# Patient Record
Sex: FEMALE | Race: ASIAN | NOT HISPANIC OR LATINO | URBAN - METROPOLITAN AREA
[De-identification: names, ages, dates, MRNs, and addresses within clinical notes are randomized per-mention and may not be internally consistent; named-entity substitution may affect disease eponyms.]

---

## 2023-04-11 ENCOUNTER — INPATIENT (INPATIENT)
Facility: HOSPITAL | Age: 33
LOS: 2 days | Discharge: HOME | DRG: 385 | End: 2023-04-14
Attending: PLASTIC SURGERY | Admitting: PLASTIC SURGERY
Payer: MEDICAID

## 2023-04-11 VITALS
RESPIRATION RATE: 18 BRPM | HEART RATE: 96 BPM | DIASTOLIC BLOOD PRESSURE: 61 MMHG | TEMPERATURE: 100 F | OXYGEN SATURATION: 95 % | SYSTOLIC BLOOD PRESSURE: 107 MMHG

## 2023-04-11 DIAGNOSIS — T50.905A ADVERSE EFFECT OF UNSPECIFIED DRUGS, MEDICAMENTS AND BIOLOGICAL SUBSTANCES, INITIAL ENCOUNTER: ICD-10-CM

## 2023-04-11 LAB
ALBUMIN SERPL ELPH-MCNC: 3.5 G/DL — SIGNIFICANT CHANGE UP (ref 3.5–5.2)
ALP SERPL-CCNC: 282 U/L — HIGH (ref 30–115)
ALT FLD-CCNC: 169 U/L — HIGH (ref 0–41)
ANION GAP SERPL CALC-SCNC: 11 MMOL/L — SIGNIFICANT CHANGE UP (ref 7–14)
APAP SERPL-MCNC: <5 UG/ML — LOW (ref 10–30)
APTT BLD: 36.3 SEC — SIGNIFICANT CHANGE UP (ref 27–39.2)
AST SERPL-CCNC: 164 U/L — HIGH (ref 0–41)
BASE EXCESS BLDV CALC-SCNC: -2.3 MMOL/L — LOW (ref -2–3)
BASOPHILS # BLD AUTO: 0 K/UL — SIGNIFICANT CHANGE UP (ref 0–0.2)
BASOPHILS NFR BLD AUTO: 0 % — SIGNIFICANT CHANGE UP (ref 0–1)
BILIRUB DIRECT SERPL-MCNC: 0.2 MG/DL — SIGNIFICANT CHANGE UP (ref 0–0.3)
BILIRUB INDIRECT FLD-MCNC: 0.2 MG/DL — SIGNIFICANT CHANGE UP (ref 0.2–1.2)
BILIRUB SERPL-MCNC: 0.4 MG/DL — SIGNIFICANT CHANGE UP (ref 0.2–1.2)
BLD GP AB SCN SERPL QL: SIGNIFICANT CHANGE UP
BUN SERPL-MCNC: 6 MG/DL — LOW (ref 10–20)
CA-I SERPL-SCNC: 1.03 MMOL/L — LOW (ref 1.15–1.33)
CALCIUM SERPL-MCNC: 7.7 MG/DL — LOW (ref 8.4–10.5)
CHLORIDE SERPL-SCNC: 100 MMOL/L — SIGNIFICANT CHANGE UP (ref 98–110)
CO2 SERPL-SCNC: 19 MMOL/L — SIGNIFICANT CHANGE UP (ref 17–32)
CREAT SERPL-MCNC: 0.7 MG/DL — SIGNIFICANT CHANGE UP (ref 0.7–1.5)
EGFR: 119 ML/MIN/1.73M2 — SIGNIFICANT CHANGE UP
EOSINOPHIL # BLD AUTO: 0.17 K/UL — SIGNIFICANT CHANGE UP (ref 0–0.7)
EOSINOPHIL NFR BLD AUTO: 1.7 % — SIGNIFICANT CHANGE UP (ref 0–8)
FLUAV AG NPH QL: SIGNIFICANT CHANGE UP
FLUBV AG NPH QL: SIGNIFICANT CHANGE UP
GAS PNL BLDV: 130 MMOL/L — LOW (ref 136–145)
GAS PNL BLDV: SIGNIFICANT CHANGE UP
GAS PNL BLDV: SIGNIFICANT CHANGE UP
GIANT PLATELETS BLD QL SMEAR: PRESENT — SIGNIFICANT CHANGE UP
GLUCOSE SERPL-MCNC: 161 MG/DL — HIGH (ref 70–99)
HCG SERPL QL: NEGATIVE — SIGNIFICANT CHANGE UP
HCO3 BLDV-SCNC: 21 MMOL/L — LOW (ref 22–29)
HCT VFR BLD CALC: 32.3 % — LOW (ref 37–47)
HCT VFR BLDA CALC: 34 % — LOW (ref 39–51)
HGB BLD CALC-MCNC: 11.3 G/DL — LOW (ref 12.6–17.4)
HGB BLD-MCNC: 11.2 G/DL — LOW (ref 12–16)
HIV 1 & 2 AB SERPL IA.RAPID: SIGNIFICANT CHANGE UP
INR BLD: 1.07 RATIO — SIGNIFICANT CHANGE UP (ref 0.65–1.3)
LACTATE BLDV-MCNC: 1.2 MMOL/L — SIGNIFICANT CHANGE UP (ref 0.5–2)
LACTATE SERPL-SCNC: 1.4 MMOL/L — SIGNIFICANT CHANGE UP (ref 0.7–2)
LIDOCAIN IGE QN: 49 U/L — SIGNIFICANT CHANGE UP (ref 7–60)
LYMPHOCYTES # BLD AUTO: 1.97 K/UL — SIGNIFICANT CHANGE UP (ref 1.2–3.4)
LYMPHOCYTES # BLD AUTO: 20 % — LOW (ref 20.5–51.1)
MACROCYTES BLD QL: SLIGHT — SIGNIFICANT CHANGE UP
MANUAL SMEAR VERIFICATION: SIGNIFICANT CHANGE UP
MCHC RBC-ENTMCNC: 25.5 PG — LOW (ref 27–31)
MCHC RBC-ENTMCNC: 34.7 G/DL — SIGNIFICANT CHANGE UP (ref 32–37)
MCV RBC AUTO: 73.4 FL — LOW (ref 81–99)
METAMYELOCYTES # FLD: 0.9 % — HIGH (ref 0–0)
MICROCYTES BLD QL: SLIGHT — SIGNIFICANT CHANGE UP
MONOCYTES # BLD AUTO: 1.2 K/UL — HIGH (ref 0.1–0.6)
MONOCYTES NFR BLD AUTO: 12.2 % — HIGH (ref 1.7–9.3)
NEUTROPHILS # BLD AUTO: 4.28 K/UL — SIGNIFICANT CHANGE UP (ref 1.4–6.5)
NEUTROPHILS NFR BLD AUTO: 38.2 % — LOW (ref 42.2–75.2)
NEUTS BAND # BLD: 5.2 % — SIGNIFICANT CHANGE UP (ref 0–6)
NRBC # BLD: 2 /100 — HIGH (ref 0–0)
NRBC # BLD: SIGNIFICANT CHANGE UP /100 WBCS (ref 0–0)
OVALOCYTES BLD QL SMEAR: SLIGHT — SIGNIFICANT CHANGE UP
PCO2 BLDV: 30 MMHG — LOW (ref 39–42)
PH BLDV: 7.45 — HIGH (ref 7.32–7.43)
PHOSPHATE SERPL-MCNC: 1.5 MG/DL — LOW (ref 2.1–4.9)
PLAT MORPH BLD: NORMAL — SIGNIFICANT CHANGE UP
PLATELET # BLD AUTO: 202 K/UL — SIGNIFICANT CHANGE UP (ref 130–400)
PMV BLD: 10.2 FL — SIGNIFICANT CHANGE UP (ref 7.4–10.4)
PO2 BLDV: 59 MMHG — SIGNIFICANT CHANGE UP
POIKILOCYTOSIS BLD QL AUTO: SLIGHT — SIGNIFICANT CHANGE UP
POLYCHROMASIA BLD QL SMEAR: SLIGHT — SIGNIFICANT CHANGE UP
POTASSIUM BLDV-SCNC: 3.5 MMOL/L — SIGNIFICANT CHANGE UP (ref 3.5–5.1)
POTASSIUM SERPL-MCNC: 3.8 MMOL/L — SIGNIFICANT CHANGE UP (ref 3.5–5)
POTASSIUM SERPL-SCNC: 3.8 MMOL/L — SIGNIFICANT CHANGE UP (ref 3.5–5)
PROMYELOCYTES # FLD: 0.9 % — HIGH (ref 0–0)
PROT SERPL-MCNC: 6.1 G/DL — SIGNIFICANT CHANGE UP (ref 6–8)
PROTHROM AB SERPL-ACNC: 12.2 SEC — SIGNIFICANT CHANGE UP (ref 9.95–12.87)
RBC # BLD: 4.4 M/UL — SIGNIFICANT CHANGE UP (ref 4.2–5.4)
RBC # FLD: 13.4 % — SIGNIFICANT CHANGE UP (ref 11.5–14.5)
RBC BLD AUTO: ABNORMAL
RSV RNA NPH QL NAA+NON-PROBE: SIGNIFICANT CHANGE UP
SAO2 % BLDV: 93.5 % — SIGNIFICANT CHANGE UP
SARS-COV-2 RNA SPEC QL NAA+PROBE: SIGNIFICANT CHANGE UP
SMUDGE CELLS # BLD: PRESENT — SIGNIFICANT CHANGE UP
SODIUM SERPL-SCNC: 130 MMOL/L — LOW (ref 135–146)
VARIANT LYMPHS # BLD: 20.9 % — HIGH (ref 0–5)
WBC # BLD: 9.87 K/UL — SIGNIFICANT CHANGE UP (ref 4.8–10.8)
WBC # FLD AUTO: 9.87 K/UL — SIGNIFICANT CHANGE UP (ref 4.8–10.8)

## 2023-04-11 PROCEDURE — 81003 URINALYSIS AUTO W/O SCOPE: CPT

## 2023-04-11 PROCEDURE — 83735 ASSAY OF MAGNESIUM: CPT

## 2023-04-11 PROCEDURE — 71045 X-RAY EXAM CHEST 1 VIEW: CPT

## 2023-04-11 PROCEDURE — 93010 ELECTROCARDIOGRAM REPORT: CPT

## 2023-04-11 PROCEDURE — 76705 ECHO EXAM OF ABDOMEN: CPT

## 2023-04-11 PROCEDURE — 80053 COMPREHEN METABOLIC PANEL: CPT

## 2023-04-11 PROCEDURE — 85025 COMPLETE CBC W/AUTO DIFF WBC: CPT

## 2023-04-11 PROCEDURE — 71045 X-RAY EXAM CHEST 1 VIEW: CPT | Mod: 26

## 2023-04-11 PROCEDURE — 36415 COLL VENOUS BLD VENIPUNCTURE: CPT

## 2023-04-11 PROCEDURE — 84100 ASSAY OF PHOSPHORUS: CPT

## 2023-04-11 PROCEDURE — 99222 1ST HOSP IP/OBS MODERATE 55: CPT

## 2023-04-11 PROCEDURE — 93005 ELECTROCARDIOGRAM TRACING: CPT

## 2023-04-11 PROCEDURE — 87081 CULTURE SCREEN ONLY: CPT

## 2023-04-11 PROCEDURE — 86703 HIV-1/HIV-2 1 RESULT ANTBDY: CPT

## 2023-04-11 PROCEDURE — 87086 URINE CULTURE/COLONY COUNT: CPT

## 2023-04-11 PROCEDURE — 99285 EMERGENCY DEPT VISIT HI MDM: CPT

## 2023-04-11 RX ORDER — SODIUM CHLORIDE 9 MG/ML
1000 INJECTION INTRAMUSCULAR; INTRAVENOUS; SUBCUTANEOUS
Refills: 0 | Status: DISCONTINUED | OUTPATIENT
Start: 2023-04-11 | End: 2023-04-14

## 2023-04-11 RX ORDER — PANTOPRAZOLE SODIUM 20 MG/1
40 TABLET, DELAYED RELEASE ORAL
Refills: 0 | Status: DISCONTINUED | OUTPATIENT
Start: 2023-04-11 | End: 2023-04-14

## 2023-04-11 RX ORDER — ENOXAPARIN SODIUM 100 MG/ML
40 INJECTION SUBCUTANEOUS EVERY 24 HOURS
Refills: 0 | Status: DISCONTINUED | OUTPATIENT
Start: 2023-04-11 | End: 2023-04-14

## 2023-04-11 RX ORDER — AMPICILLIN SODIUM AND SULBACTAM SODIUM 250; 125 MG/ML; MG/ML
1.5 INJECTION, POWDER, FOR SUSPENSION INTRAMUSCULAR; INTRAVENOUS EVERY 6 HOURS
Refills: 0 | Status: DISCONTINUED | OUTPATIENT
Start: 2023-04-11 | End: 2023-04-14

## 2023-04-11 RX ORDER — SENNA PLUS 8.6 MG/1
2 TABLET ORAL AT BEDTIME
Refills: 0 | Status: DISCONTINUED | OUTPATIENT
Start: 2023-04-11 | End: 2023-04-14

## 2023-04-11 RX ORDER — ACETAMINOPHEN 500 MG
650 TABLET ORAL EVERY 6 HOURS
Refills: 0 | Status: DISCONTINUED | OUTPATIENT
Start: 2023-04-11 | End: 2023-04-14

## 2023-04-11 RX ORDER — IBUPROFEN 200 MG
400 TABLET ORAL EVERY 6 HOURS
Refills: 0 | Status: DISCONTINUED | OUTPATIENT
Start: 2023-04-11 | End: 2023-04-14

## 2023-04-11 RX ADMIN — SODIUM CHLORIDE 75 MILLILITER(S): 9 INJECTION INTRAMUSCULAR; INTRAVENOUS; SUBCUTANEOUS at 22:50

## 2023-04-11 RX ADMIN — PANTOPRAZOLE SODIUM 40 MILLIGRAM(S): 20 TABLET, DELAYED RELEASE ORAL at 22:49

## 2023-04-11 RX ADMIN — SENNA PLUS 2 TABLET(S): 8.6 TABLET ORAL at 22:48

## 2023-04-11 RX ADMIN — ENOXAPARIN SODIUM 40 MILLIGRAM(S): 100 INJECTION SUBCUTANEOUS at 22:48

## 2023-04-11 NOTE — ED PROVIDER NOTE - NS ED ATTENDING STATEMENT MOD
This was a shared visit with the LIZBETH. I reviewed and verified the documentation and independently performed the documented:

## 2023-04-11 NOTE — H&P ADULT - NSHPSOCIALHISTORY_GEN_ALL_CORE
Lives alone on 2nd floor  denies cigarette smoking  denies drug or alcohol use  no recent sexual activity

## 2023-04-11 NOTE — ED PROVIDER NOTE - PHYSICAL EXAMINATION
VITAL SIGNS: I have reviewed nursing notes and confirm.  CONSTITUTIONAL: 31 yo F laying on stretcher; in no acute distress.  SKIN: Skin exam is warm and dry. (+) diffuse blanchable morbiliform rash  HEAD: Normocephalic; atraumatic.  EYES: PERRL, EOM intact; conjunctiva and sclera clear.  ENT: No nasal discharge; airway clear. (+) tonsillar erythema and exudates. Uvula midline. No drooling or stridor.   NECK: Supple; non tender.  CARD: S1, S2 normal; no murmurs, gallops, or rubs. Regular rate and rhythm.  RESP: No wheezes, rales or rhonchi. Speaking in full sentences.   ABD: Normal bowel sounds; soft; non-distended; non-tender; No rebound or guarding. No CVA tenderness.  EXT: Normal ROM. No clubbing, cyanosis or edema. No calf TTP or swelling.   NEURO: Alert, oriented. Grossly unremarkable. No focal deficits.

## 2023-04-11 NOTE — ED PROVIDER NOTE - CLINICAL SUMMARY MEDICAL DECISION MAKING FREE TEXT BOX
32-year-old female with no relevant past medical history transferred from [facility] for possible DRESS syndrome and burn evaluation.  Patient was treated with Bactrim for cyst to the back, following which she had a fever for 5 days, 2-3 episodes of nonbloody nonbilious vomiting, rash that developed throughout body.  Patient was given steroids, broad-spectrum antibiotics at [facility].  Patient is well-appearing on arrival, vitals stable.  Noted to have nonblanching rash throughout body, minimal ulcers to the oropharynx.  Lungs clear to station bilaterally.  Abdomen soft nontender nondistended.  No lower extremity edema noted. Unremarkable neurological exam. Labs with lymphocytosis, transaminitis, metabolic acidosis with respiratory alkalosis. hypocalcemia noted. Endorses taking approx 2-3 doses of 650mg of Tylenol x 3-4 days and then only 1, 650 mg dose of Tylenol for the last few days, denies alcohol use. Chest x-ray independently interpreted by me with no focal opacity.  EKG normal. Burn PA at bedside, will admit to their service.

## 2023-04-11 NOTE — H&P ADULT - ASSESSMENT
32-year-old female with no significant past medical history presents as transfer from [facility] ([facility]) for evaluation of fever and diffuse itchy rash.    Plan:  - admit to Burn service: med/surg floor  - IVF  - IV abx  - pain management  - diet, reg  - steroids given at [facility], will consider additional  - Derm c/s placed, f/u  - no open wounds  - possible skin biopsy        Patient in agreement with plan, all questions answered.

## 2023-04-11 NOTE — H&P ADULT - NSHPLABSRESULTS_GEN_ALL_CORE
LABS:                        11.2   9.87  )-----------( 202      ( 11 Apr 2023 21:08 )             32.3     11 Apr 2023 21:08    130    |  100    |  6      ----------------------------<  161    3.8     |  19     |  0.7      Ca    7.7        11 Apr 2023 21:08  Phos  1.5       11 Apr 2023 21:08    TPro  6.1    /  Alb  3.5    /  TBili  0.4    /  DBili  0.2    /  AST  164    /  ALT  169    /  AlkPhos  282    11 Apr 2023 21:08    PT/INR - ( 11 Apr 2023 21:08 )   PT: 12.20 sec;   INR: 1.07 ratio         PTT - ( 11 Apr 2023 21:08 )  PTT:36.3 sec      Vital Signs Last 24 Hrs  T(C): 37.7 (11 Apr 2023 20:30), Max: 37.7 (11 Apr 2023 20:29)  T(F): 99.8 (11 Apr 2023 20:30), Max: 99.8 (11 Apr 2023 20:29)  HR: 88 (11 Apr 2023 23:15) (88 - 96)  BP: 103/57 (11 Apr 2023 23:15) (103/57 - 107/61)  RR: 18 (11 Apr 2023 23:15) (18 - 20)  SpO2: 97% (11 Apr 2023 23:15) (95% - 97%)    Parameters below as of 11 Apr 2023 23:15  Patient On (Oxygen Delivery Method): room air

## 2023-04-11 NOTE — ED PROVIDER NOTE - OBJECTIVE STATEMENT
32-year-old female with no significant past medical history presents to the ED for evaluation of fever and diffuse itchy rash.  Patient states about 2 weeks ago she had a cyst drainage on her back and was subsequently started on Bactrim.  Since then she has been intermittently taking Bactrim and 5 days ago she developed fever, sore throat and rash.  She went to see her PMD and was diagnosed with strep, and was started on Augmentin.  Patient has also been taking ibuprofen and Tylenol for her fever during this time.  Rash initially started on her wrists and spread to her entire body.  She went to [facility], was given ibuprofen, prednisone, Rocephin, Vancomycin, clindamycin, Claritin and famotidine. and was transferred here for burn evaluation.  She denies other complaints. Pt denies nausea, vomiting, abdominal pain, diarrhea, headache, dizziness, weakness, chest pain, SOB, back pain, LOC, trauma, urinary symptoms, cough, calf pain/swelling, recent travel, recent surgery. 32-year-old female with no significant past medical history presents to the ED for evaluation of fever and diffuse itchy rash.  Patient states about 2 weeks ago she had a cyst drainage on her back and was subsequently started on Bactrim.  Since then she has been intermittently taking Bactrim and 5 days ago she developed fever, decreased appetite, NBNB vomiting after eating, few episodes of non-bloody diarrhea, sore throat and rash.  She went to see her PMD and was diagnosed with strep, and was started on Augmentin.  Patient has also been taking ibuprofen and Tylenol for her fever during this time.  Rash initially started on her wrists and spread to her entire body.  She went to [facility], was given ibuprofen, prednisone, Rocephin, Vancomycin, clindamycin, Claritin and famotidine. and was transferred here for burn evaluation.  She denies other complaints. Pt denies headache, dizziness, weakness, chest pain, SOB, back pain, LOC, trauma, urinary symptoms, cough, calf pain/swelling, recent travel, recent surgery.

## 2023-04-11 NOTE — H&P ADULT - HISTORY OF PRESENT ILLNESS
32-year-old female with no significant past medical history presents as transfer from [facility] ([facility]) to the ED for evaluation of fever and diffuse itchy rash.  Patient states about 3 weeks ago she had a cyst drainage on her back by dermatology and was subsequently started on Bactrim.  Since then she has been intermittently taking Bactrim and 5 days ago she developed fever, decreased appetite, NBNB vomiting after eating, few episodes of non-bloody diarrhea, sore throat and diffuse rash starting in the upper and lower extremities first then the face and torso.  She went to see her clinic and was prescribed ibuprofen and sent home. Patient went again to the clinic few days later and was diagnosed with strep, and was started on Augmentin and Zyrtec.  Patient has also been taking ibuprofen and Tylenol for her fever during this time.  She went to [facility], was given ibuprofen, prednisone, Rocephin, Vancomycin, clindamycin, Claritin and famotidine. and was transferred here for burn evaluation.  She denies other complaints. Pt denies headache, dizziness, weakness, chest pain, SOB, back pain, LOC, trauma, urinary symptoms, cough, calf pain/swelling, recent travel, recent surgery, no recent sexual activity, LMP: 1 week ago.    Of note, pt states she had a similar rash but not as diffuse when she was in highschool and was on medication for hypothyroid but does not recall the name.

## 2023-04-11 NOTE — ED PROVIDER NOTE - CARE PLAN
1 Principal Discharge DX:	Drug reaction  Secondary Diagnosis:	Fever   Principal Discharge DX:	Drug reaction  Secondary Diagnosis:	Fever  Secondary Diagnosis:	Transaminitis  Secondary Diagnosis:	Hypocalcemia  Secondary Diagnosis:	Metabolic alkalosis

## 2023-04-11 NOTE — H&P ADULT - NSHPPHYSICALEXAM_GEN_ALL_CORE
Gen: NAD, Lying on stretcher  HEENT: NC/AT, EOMI, no icterus, no conjunctival injection, PERRLA, small oral ulceration to inner lower lip  Neck: trachea midline  Chest: full equal chest rise  Abd: soft non-tender, non-distended  Skin: diffuse non blanching erythematous macular rash to bilateral upper and lower extremities circumferentially (sparring palms, soles, and interdigits) and entire torso and face

## 2023-04-11 NOTE — ED ADULT TRIAGE NOTE - CHIEF COMPLAINT QUOTE
Patient CHIKISA from [facility] ED for Burn consult for Dress Syndrome. Patient alert and oriented, VSS in triage. Extensive rash noted. Patient CHUCHO from [facility] ED for Burn consult for Dress Syndrome. Patient alert and oriented, VSS in triage. Diffuse rash noted to full body starting Saturday evening. Patient noted possible allergy to a thyroid medication taken in high school.

## 2023-04-11 NOTE — ED ADULT NURSE REASSESSMENT NOTE - NS ED NURSE REASSESS COMMENT FT1
Pt received from outgoing shift at 2300. Pt calmly resting in bed at this time. Pt is alert and oriented x3. No s/s of respiratory distress. Pt is able to make all needs known. Pt has no further complaints at this time. Pt is admitted to Tele service/Burn Unit. Pending bed status. Safety and Fall precautions in place as per hospital policy. Will continue to monitor.

## 2023-04-11 NOTE — ED PROVIDER NOTE - ATTENDING APP SHARED VISIT CONTRIBUTION OF CARE
32-year-old female with no relevant past medical history transferred from [facility] for possible DRESS syndrome and burn evaluation.  Patient was treated with Bactrim for cyst to the back, following which she had a fever for 5 days, 2-3 episodes of nonbloody nonbilious vomiting, rash that developed throughout body.  Patient was given steroids, broad-spectrum antibiotics at [facility].  Patient is well-appearing on arrival, vitals stable.  Noted to have nonblanching rash throughout body, minimal ulcers to the oropharynx.  Lungs clear to station bilaterally.  Abdomen soft nontender nondistended.  No lower extremity edema noted.  Unremarkable neurological exam. burn PA at bedside, will admit to their service.

## 2023-04-12 LAB
ALBUMIN SERPL ELPH-MCNC: 3.5 G/DL — SIGNIFICANT CHANGE UP (ref 3.5–5.2)
ALP SERPL-CCNC: 301 U/L — HIGH (ref 30–115)
ALT FLD-CCNC: 176 U/L — HIGH (ref 0–41)
ANION GAP SERPL CALC-SCNC: 11 MMOL/L — SIGNIFICANT CHANGE UP (ref 7–14)
APPEARANCE UR: CLEAR — SIGNIFICANT CHANGE UP
AST SERPL-CCNC: 149 U/L — HIGH (ref 0–41)
BASOPHILS # BLD AUTO: 0.1 K/UL — SIGNIFICANT CHANGE UP (ref 0–0.2)
BASOPHILS NFR BLD AUTO: 0.7 % — SIGNIFICANT CHANGE UP (ref 0–1)
BILIRUB SERPL-MCNC: 0.3 MG/DL — SIGNIFICANT CHANGE UP (ref 0.2–1.2)
BILIRUB UR-MCNC: NEGATIVE — SIGNIFICANT CHANGE UP
BUN SERPL-MCNC: 6 MG/DL — LOW (ref 10–20)
CALCIUM SERPL-MCNC: 7.6 MG/DL — LOW (ref 8.4–10.5)
CHLORIDE SERPL-SCNC: 102 MMOL/L — SIGNIFICANT CHANGE UP (ref 98–110)
CO2 SERPL-SCNC: 22 MMOL/L — SIGNIFICANT CHANGE UP (ref 17–32)
COLOR SPEC: SIGNIFICANT CHANGE UP
CREAT SERPL-MCNC: 0.6 MG/DL — LOW (ref 0.7–1.5)
DIFF PNL FLD: NEGATIVE — SIGNIFICANT CHANGE UP
EGFR: 123 ML/MIN/1.73M2 — SIGNIFICANT CHANGE UP
EOSINOPHIL # BLD AUTO: 0.23 K/UL — SIGNIFICANT CHANGE UP (ref 0–0.7)
EOSINOPHIL NFR BLD AUTO: 1.7 % — SIGNIFICANT CHANGE UP (ref 0–8)
GLUCOSE SERPL-MCNC: 129 MG/DL — HIGH (ref 70–99)
GLUCOSE UR QL: NEGATIVE — SIGNIFICANT CHANGE UP
HAV IGM SER-ACNC: SIGNIFICANT CHANGE UP
HBV CORE IGM SER-ACNC: SIGNIFICANT CHANGE UP
HBV SURFACE AG SER-ACNC: SIGNIFICANT CHANGE UP
HCT VFR BLD CALC: 31.3 % — LOW (ref 37–47)
HCV AB S/CO SERPL IA: 0.16 S/CO — SIGNIFICANT CHANGE UP (ref 0–0.99)
HCV AB SERPL-IMP: SIGNIFICANT CHANGE UP
HETEROPH AB TITR SER AGGL: NEGATIVE — SIGNIFICANT CHANGE UP
HGB BLD-MCNC: 10.7 G/DL — LOW (ref 12–16)
IMM GRANULOCYTES NFR BLD AUTO: 2.7 % — HIGH (ref 0.1–0.3)
KETONES UR-MCNC: NEGATIVE — SIGNIFICANT CHANGE UP
LEUKOCYTE ESTERASE UR-ACNC: NEGATIVE — SIGNIFICANT CHANGE UP
LYMPHOCYTES # BLD AUTO: 52.7 % — HIGH (ref 20.5–51.1)
LYMPHOCYTES # BLD AUTO: 7.1 K/UL — HIGH (ref 1.2–3.4)
MAGNESIUM SERPL-MCNC: 2.4 MG/DL — SIGNIFICANT CHANGE UP (ref 1.8–2.4)
MCHC RBC-ENTMCNC: 25.2 PG — LOW (ref 27–31)
MCHC RBC-ENTMCNC: 34.2 G/DL — SIGNIFICANT CHANGE UP (ref 32–37)
MCV RBC AUTO: 73.8 FL — LOW (ref 81–99)
MONOCYTES # BLD AUTO: 1.88 K/UL — HIGH (ref 0.1–0.6)
MONOCYTES NFR BLD AUTO: 14 % — HIGH (ref 1.7–9.3)
NEUTROPHILS # BLD AUTO: 3.79 K/UL — SIGNIFICANT CHANGE UP (ref 1.4–6.5)
NEUTROPHILS NFR BLD AUTO: 28.2 % — LOW (ref 42.2–75.2)
NITRITE UR-MCNC: NEGATIVE — SIGNIFICANT CHANGE UP
NRBC # BLD: 0 /100 WBCS — SIGNIFICANT CHANGE UP (ref 0–0)
PH UR: 6.5 — SIGNIFICANT CHANGE UP (ref 5–8)
PHOSPHATE SERPL-MCNC: 1.3 MG/DL — LOW (ref 2.1–4.9)
PLATELET # BLD AUTO: 210 K/UL — SIGNIFICANT CHANGE UP (ref 130–400)
PMV BLD: 10.4 FL — SIGNIFICANT CHANGE UP (ref 7.4–10.4)
POTASSIUM SERPL-MCNC: 3.9 MMOL/L — SIGNIFICANT CHANGE UP (ref 3.5–5)
POTASSIUM SERPL-SCNC: 3.9 MMOL/L — SIGNIFICANT CHANGE UP (ref 3.5–5)
PROT SERPL-MCNC: 6 G/DL — SIGNIFICANT CHANGE UP (ref 6–8)
PROT UR-MCNC: SIGNIFICANT CHANGE UP
RBC # BLD: 4.24 M/UL — SIGNIFICANT CHANGE UP (ref 4.2–5.4)
RBC # FLD: 13.7 % — SIGNIFICANT CHANGE UP (ref 11.5–14.5)
SODIUM SERPL-SCNC: 135 MMOL/L — SIGNIFICANT CHANGE UP (ref 135–146)
SP GR SPEC: 1.01 — SIGNIFICANT CHANGE UP (ref 1.01–1.03)
UROBILINOGEN FLD QL: SIGNIFICANT CHANGE UP
WBC # BLD: 13.46 K/UL — HIGH (ref 4.8–10.8)
WBC # FLD AUTO: 13.46 K/UL — HIGH (ref 4.8–10.8)

## 2023-04-12 PROCEDURE — 99232 SBSQ HOSP IP/OBS MODERATE 35: CPT

## 2023-04-12 PROCEDURE — 76705 ECHO EXAM OF ABDOMEN: CPT | Mod: 26

## 2023-04-12 PROCEDURE — 31575 DIAGNOSTIC LARYNGOSCOPY: CPT

## 2023-04-12 PROCEDURE — 99221 1ST HOSP IP/OBS SF/LOW 40: CPT

## 2023-04-12 RX ORDER — DIPHENHYDRAMINE HCL 50 MG
25 CAPSULE ORAL EVERY 6 HOURS
Refills: 0 | Status: DISCONTINUED | OUTPATIENT
Start: 2023-04-12 | End: 2023-04-14

## 2023-04-12 RX ORDER — CHLORHEXIDINE GLUCONATE 213 G/1000ML
1 SOLUTION TOPICAL
Refills: 0 | Status: DISCONTINUED | OUTPATIENT
Start: 2023-04-12 | End: 2023-04-14

## 2023-04-12 RX ADMIN — AMPICILLIN SODIUM AND SULBACTAM SODIUM 100 GRAM(S): 250; 125 INJECTION, POWDER, FOR SUSPENSION INTRAMUSCULAR; INTRAVENOUS at 05:28

## 2023-04-12 RX ADMIN — Medication 125 MILLIGRAM(S): at 10:37

## 2023-04-12 RX ADMIN — ENOXAPARIN SODIUM 40 MILLIGRAM(S): 100 INJECTION SUBCUTANEOUS at 21:52

## 2023-04-12 RX ADMIN — AMPICILLIN SODIUM AND SULBACTAM SODIUM 100 GRAM(S): 250; 125 INJECTION, POWDER, FOR SUSPENSION INTRAMUSCULAR; INTRAVENOUS at 17:50

## 2023-04-12 RX ADMIN — AMPICILLIN SODIUM AND SULBACTAM SODIUM 100 GRAM(S): 250; 125 INJECTION, POWDER, FOR SUSPENSION INTRAMUSCULAR; INTRAVENOUS at 23:19

## 2023-04-12 RX ADMIN — AMPICILLIN SODIUM AND SULBACTAM SODIUM 100 GRAM(S): 250; 125 INJECTION, POWDER, FOR SUSPENSION INTRAMUSCULAR; INTRAVENOUS at 00:28

## 2023-04-12 RX ADMIN — AMPICILLIN SODIUM AND SULBACTAM SODIUM 100 GRAM(S): 250; 125 INJECTION, POWDER, FOR SUSPENSION INTRAMUSCULAR; INTRAVENOUS at 11:25

## 2023-04-12 RX ADMIN — PANTOPRAZOLE SODIUM 40 MILLIGRAM(S): 20 TABLET, DELAYED RELEASE ORAL at 05:28

## 2023-04-12 NOTE — CONSULT NOTE ADULT - ASSESSMENT
IMPRESSION  # Diffuse Maculopapular Rash - SJS/TEN Overlap vs. Drug Eruption (no eosinophilia)    RECOMMENDATIONS  - c/w solumedrol at current dose  - obtain skin biopsy for histopathological diagnosis  - Agree w/Flex Scope by ENT to evaluate airway   - Careful clinical observation for progression  - For now no need for IVIG/PEX/Cyclosporine  - If biopsy confirmed Dx of SJS potential role for TNF inhibitors over continued steroids   
32-year-old female with no significant past medical history presents as transfer from [facility] ([facility]) to the ED for evaluation of fever and diffuse itchy rash.      - FFL reviewed and patient seen during afternoon rounds with Dr. Zapien.  -Cont management as per Burn and Dermatology   -Recall ENT prn ([id number])

## 2023-04-12 NOTE — PATIENT PROFILE ADULT - NSPROPATIENTLACTATING_GEN_A_NUR
Comfortable in bed, no complaints.  Exam as above.  Will need dialysis catheter removed and tunneled catheter place. l negative.   Possible dialysis in am with removal of catheter and replacement Monday. Awaiting GUANAKO  Last cultures still negative.  ID, heme, renal fu. To start to taper steroids for DRESS. no

## 2023-04-12 NOTE — PROGRESS NOTE ADULT - ASSESSMENT
32-year-old female with no significant past medical history presents as transfer from [facility] ([facility]) for evaluation of fever and diffuse itchy rash.    Plan:  - IVF  - IV abx  - pain management  - diet, reg  - IV solumedrol 125mg IV x2 days, then 40mg BID for 2 days then continue taper  - Derm c/s placed, f/u  - no open wounds  - possible skin biopsy        Patient in agreement with plan, all questions answered.     32-year-old female with no significant past medical history presents as transfer from [facility] ([facility]) for evaluation of fever and diffuse itchy rash.    Plan:  - IVF  - IV abx  - pain management  - diet, reg  - IV solumedrol 125mg IV x2 days, then 40mg BID for 2 days then continue taper  - Derm consult 4/12: c/w solumedrol at current dose, - obtain skin biopsy for histopathological diagnosis, - Agree w/Flex Scope by ENT to evaluate airway,  Careful clinical observation for progression. - For now no need for IVIG/PEX/Cyclosporine - If biopsy confirmed Dx of SJS potential role for TNF inhibitors over continued steroids   - no open wounds  - possible skin biopsy  - ENT consult, f/u        Patient in agreement with plan, all questions answered.

## 2023-04-12 NOTE — CONSULT NOTE ADULT - SUBJECTIVE AND OBJECTIVE BOX
[facility] DERMATOLOGY PRACTICE CONSULTATION SERVICES NOTE   BREANNE ARROYO  MRN:[id number]    SUBJECTIVE:  Currently admitted to burn service with the primary diagnosis of SJS     ID: [id number]    HPI: 32F Cantonese speaking, w/no sig PMHx (Hypothyroidism documented but patient not on medication) presented to [facility] as a transfer from [facility] in [place]. History goes back to 2023 (on or around) when pt had a cystic lesion on her back excised by her dermatologist and was placed on a course of bactrim PO. Her symptoms began abruptly on 2023 when she awoke with a fever to Tmax 39C, and a diffuse red maculopapular rash, blanching, painful and hot but not itchy, which appeared iitially on her arms then spread to her legs, chest, back and face. Pt went to her PMD on  who prescribed Zyrtec + Amoxicillin, when her sxs worsened she then went to the [facility] ED (Monday 4/10) where she was tx w/steroids and transfer arranged to [facility] burn center.     Focused ROS: No recent travel, no sick contacts, no new soaps or foods she can recall, no prior hx of similar rxn, no known severe allergies, no use of Rx or OTC/Herbal/Non-Rx supplements, denies any blisters or skin sloughing, denies genital involvement, denies contact with any exotic pets, + pus in her throat a few weeks ago per her PMD exam, unknown prior bactrim exposure. + SOB, denies cough, denies CP.       PAST MEDICAL & SURGICAL HISTORY  Hypothyroid    No significant past surgical history        SOCIAL HISTORY:    ALLERGIES:  Allergy Status Unknown      MEDICATIONS:    STANDING MEDICATIONS  ampicillin/sulbactam  IVPB 1.5 Gram(s) IV Intermittent every 6 hours  chlorhexidine 2% Cloths 1 Application(s) Topical <User Schedule>  enoxaparin Injectable 40 milliGRAM(s) SubCutaneous every 24 hours  methylPREDNISolone sodium succinate Injectable 125 milliGRAM(s) IV Push daily  pantoprazole    Tablet 40 milliGRAM(s) Oral before breakfast  senna 2 Tablet(s) Oral at bedtime  sodium chloride 0.9%. 1000 milliLiter(s) IV Continuous <Continuous>      PRN MEDICATIONS  acetaminophen     Tablet .. 650 milliGRAM(s) Oral every 6 hours PRN  diphenhydrAMINE 25 milliGRAM(s) Oral every 6 hours PRN  ibuprofen  Tablet. 400 milliGRAM(s) Oral every 6 hours PRN      VITALS:   T(F): 96.4  HR: 78  BP: 98/58  RR: 18  SpO2: 98%        LABS:                        11.2   9.87  )-----------( 202      ( 2023 21:08 )             32.3     04-11    130<L>  |  100  |  6<L>  ----------------------------<  161<H>  3.8   |  19  |  0.7    Ca    7.7<L>      2023 21:08  Phos  1.5         TPro  6.1  /  Alb  3.5  /  TBili  0.4  /  DBili  0.2  /  AST  164<H>  /  ALT  169<H>  /  AlkPhos  282<H>      PT/INR - ( 2023 21:08 )   PT: 12.20 sec;   INR: 1.07 ratio         PTT - ( 2023 21:08 )  PTT:36.3 sec  Urinalysis Basic - ( 2023 05:20 )    Color: Light Yellow / Appearance: Clear / S.009 / pH: x  Gluc: x / Ketone: Negative  / Bili: Negative / Urobili: <2 mg/dL   Blood: x / Protein: Trace / Nitrite: Negative   Leuk Esterase: Negative / RBC: x / WBC x   Sq Epi: x / Non Sq Epi: x / Bacteria: x        Lactate, Blood: 1.4 mmol/L (23 @ 21:08)            Blood Gas Venous - Lactate: 1.20 mmol/L (23 @ 21:20)  Lactate, Blood: 1.4 mmol/L (23 @ 21:08)    PHYSICAL EXAM:  GEN: NAD  HEENT: NCAT, PERRL, Anicteric  LUNGS: CTAB, Good Air Entry Bilat  HEART: RRR, +S1/S2  ABD: SNTTP, ND x 4  EXT: WWP x 4 EXT's  NEURO: AAOX3, normal affect  SKIN: Diffuse red maculopapular blanching rash involving >95% BSA, no mucocutaneous involvement, no ocular involvement.

## 2023-04-12 NOTE — CONSULT NOTE ADULT - NS ATTEND AMEND GEN_ALL_CORE FT
Seen and examined. Laryngoscopy wnl, no signs of laryngopharyngeal involvemnt. Remainder of care per burn. Tolreated well

## 2023-04-12 NOTE — ED ADULT NURSE NOTE - CHIEF COMPLAINT QUOTE
Patient CHUCHO from [facility] ED for Burn consult for Dress Syndrome. Patient alert and oriented, VSS in triage. Diffuse rash noted to full body starting Saturday evening. Patient noted possible allergy to a thyroid medication taken in high school.

## 2023-04-12 NOTE — CONSULT NOTE ADULT - NSCONSULTADDITIONALINFOA_GEN_ALL_CORE
ATTENDING  Photos show diffuse erythema of skin.  Because of lack of mucous membrane involvement, this is NOT SJS.  TEN is still possible, but lack of any blisters or sloughing point more toward routine drug eruption or DRESS [which can occur w/o eosinophilia].  Biopsy is indicated.  Continue systemic steroids.

## 2023-04-12 NOTE — PATIENT PROFILE ADULT - FALL HARM RISK - UNIVERSAL INTERVENTIONS
Bed in lowest position, wheels locked, appropriate side rails in place/Call bell, personal items and telephone in reach/Instruct patient to call for assistance before getting out of bed or chair/Non-slip footwear when patient is out of bed/[place] to call system/Physically safe environment - no spills, clutter or unnecessary equipment/Purposeful Proactive Rounding/Room/bathroom lighting operational, light cord in reach

## 2023-04-12 NOTE — CONSULT NOTE ADULT - SUBJECTIVE AND OBJECTIVE BOX
HPI:  32-year-old female with no significant past medical history presents as transfer from [facility] ([facility]) to the ED for evaluation of fever and diffuse itchy rash.  Patient states about 3 weeks ago she had a cyst drainage on her back by dermatology and was subsequently started on Bactrim.  Since then she has been intermittently taking Bactrim and 5 days ago she developed fever, decreased appetite, NBNB vomiting after eating, few episodes of non-bloody diarrhea, sore throat and diffuse rash starting in the upper and lower extremities first then the face and torso.  She went to see her clinic and was prescribed ibuprofen and sent home. Patient went again to the clinic few days later and was diagnosed with strep, and was started on Augmentin and Zyrtec.  Patient has also been taking ibuprofen and Tylenol for her fever during this time.  She went to [facility], was given ibuprofen, prednisone, Rocephin, Vancomycin, clindamycin, Claritin and famotidine. and was transferred here for burn evaluation.  She denies other complaints. Pt denies headache, dizziness, weakness, chest pain, SOB, back pain, LOC, trauma, urinary symptoms, cough, calf pain/swelling, recent travel, recent surgery, no recent sexual activity, LMP: 1 week ago.  Of note, pt states she had a similar rash but not as diffuse when she was in highschool and was on medication for hypothyroid but does not recall the name. 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 Pt seen and examined at bedside--states when she developed the rash, she had associate throat pain. Reports initially on Friday    PAST MEDICAL & SURGICAL HISTORY:  Hypothyroid      MEDICATIONS  (STANDING):  ampicillin/sulbactam  IVPB 1.5 Gram(s) IV Intermittent every 6 hours  chlorhexidine 2% Cloths 1 Application(s) Topical <User Schedule>  enoxaparin Injectable 40 milliGRAM(s) SubCutaneous every 24 hours  methylPREDNISolone sodium succinate Injectable 125 milliGRAM(s) IV Push daily  pantoprazole    Tablet 40 milliGRAM(s) Oral before breakfast  senna 2 Tablet(s) Oral at bedtime  sodium chloride 0.9%. 1000 milliLiter(s) (75 mL/Hr) IV Continuous <Continuous>    MEDICATIONS  (PRN):  acetaminophen     Tablet .. 650 milliGRAM(s) Oral every 6 hours PRN Mild Pain (1 - 3)  diphenhydrAMINE 25 milliGRAM(s) Oral every 6 hours PRN Rash and/or Itching  ibuprofen  Tablet. 400 milliGRAM(s) Oral every 6 hours PRN Moderate Pain (4 - 6)      Allergies  Allergy Status Unknown    SOCIAL HISTORY:    FAMILY HISTORY:    REVIEW OF SYSTEMS   [x] A ten-point review of systems was otherwise negative except as noted.    Vital Signs Last 24 Hrs  T(C): 35.8 (12 Apr 2023 06:06), Max: 37.7 (11 Apr 2023 20:29)  T(F): 96.4 (12 Apr 2023 06:06), Max: 99.8 (11 Apr 2023 20:29)  HR: 78 (12 Apr 2023 06:06) (74 - 96)  BP: 98/58 (12 Apr 2023 06:06) (98/58 - 107/61)  BP(mean): 77 (12 Apr 2023 05:33) (77 - 77)  RR: 18 (12 Apr 2023 06:06) (18 - 20)  SpO2: 98% (12 Apr 2023 06:06) (95% - 98%)    Parameters below as of 12 Apr 2023 06:06  Patient On (Oxygen Delivery Method): room air    GEN: NAD. No drooling or pooling of secretions. No stridor. Good vocal quality, no hoarseness (per pt voice is at baseline)   NEURO: Awake and alert   SKIN:   HEENT:  NECK:  No ttp  RESP: Non-labored breathing  CARDIO: +S1/S2  EXT: MARIA x 4    Fiberoptic Laryngoscopy:     LABS:                        11.2   9.87  )-----------( 202      ( 11 Apr 2023 21:08 )             32.3     04-11    130<L>  |  100  |  6<L>  ----------------------------<  161<H>  3.8   |  19  |  0.7    Ca    7.7<L>      11 Apr 2023 21:08  Phos  1.5     04-11    TPro  6.1  /  Alb  3.5  /  TBili  0.4  /  DBili  0.2  /  AST  164<H>  /  ALT  169<H>  /  AlkPhos  282<H>  04-11    PT/INR - ( 11 Apr 2023 21:08 )   PT: 12.20 sec;   INR: 1.07 ratio       PTT - ( 11 Apr 2023 21:08 )  PTT:36.3 sec   HPI:  32-year-old female with no significant past medical history presents as transfer from [facility] ([facility]) to the ED for evaluation of fever and diffuse itchy rash.  Patient states about 3 weeks ago she had a cyst drainage on her back by dermatology and was subsequently started on Bactrim.  Since then she has been intermittently taking Bactrim and 5 days ago she developed fever, decreased appetite, NBNB vomiting after eating, few episodes of non-bloody diarrhea, sore throat and diffuse rash starting in the upper and lower extremities first then the face and torso.  She went to see her clinic and was prescribed ibuprofen and sent home. Patient went again to the clinic few days later and was diagnosed with strep, and was started on Augmentin and Zyrtec.  Patient has also been taking ibuprofen and Tylenol for her fever during this time.  She went to [facility], was given ibuprofen, prednisone, Rocephin, Vancomycin, clindamycin, Claritin and famotidine. and was transferred here for burn evaluation.  She denies other complaints. Pt denies headache, dizziness, weakness, chest pain, SOB, back pain, LOC, trauma, urinary symptoms, cough, calf pain/swelling, recent travel, recent surgery, no recent sexual activity, LMP: 1 week ago.  Of note, pt states she had a similar rash but not as diffuse when she was in highschool and was on medication for hypothyroid but does not recall the name. 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 Pt seen and examined at bedside--states when she developed the rash, she had associate throat pain. Reports initially on Friday she had throat pain to palpation, which has since improved. Pt eating lunch at time of exam without difficulty.     PAST MEDICAL & SURGICAL HISTORY:  Hypothyroid      MEDICATIONS  (STANDING):  ampicillin/sulbactam  IVPB 1.5 Gram(s) IV Intermittent every 6 hours  chlorhexidine 2% Cloths 1 Application(s) Topical <User Schedule>  enoxaparin Injectable 40 milliGRAM(s) SubCutaneous every 24 hours  methylPREDNISolone sodium succinate Injectable 125 milliGRAM(s) IV Push daily  pantoprazole    Tablet 40 milliGRAM(s) Oral before breakfast  senna 2 Tablet(s) Oral at bedtime  sodium chloride 0.9%. 1000 milliLiter(s) (75 mL/Hr) IV Continuous <Continuous>    MEDICATIONS  (PRN):  acetaminophen     Tablet .. 650 milliGRAM(s) Oral every 6 hours PRN Mild Pain (1 - 3)  diphenhydrAMINE 25 milliGRAM(s) Oral every 6 hours PRN Rash and/or Itching  ibuprofen  Tablet. 400 milliGRAM(s) Oral every 6 hours PRN Moderate Pain (4 - 6)      Allergies  Allergy Status Unknown    SOCIAL HISTORY:    FAMILY HISTORY:    REVIEW OF SYSTEMS   [x] A ten-point review of systems was otherwise negative except as noted.    Vital Signs Last 24 Hrs  T(C): 35.8 (12 Apr 2023 06:06), Max: 37.7 (11 Apr 2023 20:29)  T(F): 96.4 (12 Apr 2023 06:06), Max: 99.8 (11 Apr 2023 20:29)  HR: 78 (12 Apr 2023 06:06) (74 - 96)  BP: 98/58 (12 Apr 2023 06:06) (98/58 - 107/61)  BP(mean): 77 (12 Apr 2023 05:33) (77 - 77)  RR: 18 (12 Apr 2023 06:06) (18 - 20)  SpO2: 98% (12 Apr 2023 06:06) (95% - 98%)    Parameters below as of 12 Apr 2023 06:06  Patient On (Oxygen Delivery Method): room air    GEN: NAD. No drooling or pooling of secretions. No stridor. Good vocal quality, no hoarseness (per pt voice is at baseline)   NEURO: Awake and alert   SKIN: +diffuse erythematous rash   HEENT: Oral mucosadry, small erythematous patches noted to soft palate.   NECK:  No ttp  RESP: Non-labored breathing  CARDIO: +S1/S2  EXT: MARIA x 4    Fiberoptic Laryngoscopy: No ulcerations appreciated to laryngeal structures, patent airway.      LABS:                        11.2   9.87  )-----------( 202      ( 11 Apr 2023 21:08 )             32.3     04-11    130<L>  |  100  |  6<L>  ----------------------------<  161<H>  3.8   |  19  |  0.7    Ca    7.7<L>      11 Apr 2023 21:08  Phos  1.5     04-11    TPro  6.1  /  Alb  3.5  /  TBili  0.4  /  DBili  0.2  /  AST  164<H>  /  ALT  169<H>  /  AlkPhos  282<H>  04-11    PT/INR - ( 11 Apr 2023 21:08 )   PT: 12.20 sec;   INR: 1.07 ratio       PTT - ( 11 Apr 2023 21:08 )  PTT:36.3 sec

## 2023-04-12 NOTE — PROGRESS NOTE ADULT - SUBJECTIVE AND OBJECTIVE BOX
INTERVAL HISTORY: pts cousin at bedside, aiding in translation. State she was able to eat breakfast however the harder foods such as toast was bothersome.     Events past 24 hrs***  Vital Signs Last 24 Hrs  T(C): 35.8 (2023 06:06), Max: 37.7 (2023 20:29)  T(F): 96.4 (2023 06:06), Max: 99.8 (2023 20:29)  HR: 78 (2023 06:06) (74 - 96)  BP: 98/58 (2023 06:06) (98/58 - 107/61)  BP(mean): 77 (2023 05:33) (77 - 77)  RR: 18 (2023 06:06) (18 - 20)  SpO2: 98% (2023 06:06) (95% - 98%)    Parameters below as of 2023 06:06  Patient On (Oxygen Delivery Method): room air    MEDICATIONS  (STANDING):  ampicillin/sulbactam  IVPB 1.5 Gram(s) IV Intermittent every 6 hours  chlorhexidine 2% Cloths 1 Application(s) Topical <User Schedule>  enoxaparin Injectable 40 milliGRAM(s) SubCutaneous every 24 hours  methylPREDNISolone sodium succinate Injectable 125 milliGRAM(s) IV Push daily  pantoprazole    Tablet 40 milliGRAM(s) Oral before breakfast  senna 2 Tablet(s) Oral at bedtime  sodium chloride 0.9%. 1000 milliLiter(s) (75 mL/Hr) IV Continuous <Continuous>    MEDICATIONS  (PRN):  acetaminophen     Tablet .. 650 milliGRAM(s) Oral every 6 hours PRN Mild Pain (1 - 3)  diphenhydrAMINE 25 milliGRAM(s) Oral every 6 hours PRN Rash and/or Itching  ibuprofen  Tablet. 400 milliGRAM(s) Oral every 6 hours PRN Moderate Pain (4 - 6)    Allergies  Allergy Status Unknown  Intolerances    Lab Results:                        11.2   9.87  )-----------( 202      ( 2023 21:08 )             32.3     04-11    130<L>  |  100  |  6<L>  ----------------------------<  161<H>  3.8   |  19  |  0.7    Ca    7.7<L>      2023 21:08  Phos  1.5     -    TPro  6.1  /  Alb  3.5  /  TBili  0.4  /  DBili  0.2  /  AST  164<H>  /  ALT  169<H>  /  AlkPhos  282<H>      PT/INR - ( 2023 21:08 )   PT: 12.20 sec;   INR: 1.07 ratio         PTT - ( 2023 21:08 )  PTT:36.3 sec  Urinalysis Basic - ( 2023 05:20 )    Color: Light Yellow / Appearance: Clear / S.009 / pH: x  Gluc: x / Ketone: Negative  / Bili: Negative / Urobili: <2 mg/dL   Blood: x / Protein: Trace / Nitrite: Negative   Leuk Esterase: Negative / RBC: x / WBC x   Sq Epi: x / Non Sq Epi: x / Bacteria: x    LIVER FUNCTIONS - ( 2023 21:08 )  Alb: 3.5 g/dL / Pro: 6.1 g/dL / ALK PHOS: 282 U/L / ALT: 169 U/L / AST: 164 U/L / GGT: x           Physical Exam:  Gen: NAD, sitting up in bed having breakfast  HEENT: NC/AT, EOMI, no icterus, no conjunctival injection, PERRLA, small oral ulceration to inner lower lip  Neck: trachea midline  Chest: full equal chest rise, no cardiopulmonary compromise  Abd: soft non-tender, non-distended  Skin: diffuse non blanching erythematous macular rash to bilateral upper and lower extremities circumferentially (sparring palms, soles, and interdigits) and entire torso and face  no open wounds appreciated. no blistering.

## 2023-04-13 LAB
ALBUMIN SERPL ELPH-MCNC: 3.5 G/DL — SIGNIFICANT CHANGE UP (ref 3.5–5.2)
ALP SERPL-CCNC: 283 U/L — HIGH (ref 30–115)
ALT FLD-CCNC: 166 U/L — HIGH (ref 0–41)
ANION GAP SERPL CALC-SCNC: 12 MMOL/L — SIGNIFICANT CHANGE UP (ref 7–14)
AST SERPL-CCNC: 90 U/L — HIGH (ref 0–41)
BASOPHILS # BLD AUTO: 0.14 K/UL — SIGNIFICANT CHANGE UP (ref 0–0.2)
BASOPHILS NFR BLD AUTO: 0.9 % — SIGNIFICANT CHANGE UP (ref 0–1)
BILIRUB SERPL-MCNC: 0.5 MG/DL — SIGNIFICANT CHANGE UP (ref 0.2–1.2)
BUN SERPL-MCNC: 9 MG/DL — LOW (ref 10–20)
CALCIUM SERPL-MCNC: 7.9 MG/DL — LOW (ref 8.4–10.5)
CHLORIDE SERPL-SCNC: 100 MMOL/L — SIGNIFICANT CHANGE UP (ref 98–110)
CO2 SERPL-SCNC: 22 MMOL/L — SIGNIFICANT CHANGE UP (ref 17–32)
CREAT SERPL-MCNC: 0.5 MG/DL — LOW (ref 0.7–1.5)
CULTURE RESULTS: NO GROWTH — SIGNIFICANT CHANGE UP
CULTURE RESULTS: SIGNIFICANT CHANGE UP
EGFR: 129 ML/MIN/1.73M2 — SIGNIFICANT CHANGE UP
EOSINOPHIL # BLD AUTO: 0.26 K/UL — SIGNIFICANT CHANGE UP (ref 0–0.7)
EOSINOPHIL NFR BLD AUTO: 1.7 % — SIGNIFICANT CHANGE UP (ref 0–8)
GLUCOSE SERPL-MCNC: 186 MG/DL — HIGH (ref 70–99)
HCT VFR BLD CALC: 33.9 % — LOW (ref 37–47)
HGB BLD-MCNC: 11.5 G/DL — LOW (ref 12–16)
LYMPHOCYTES # BLD AUTO: 18.4 % — LOW (ref 20.5–51.1)
LYMPHOCYTES # BLD AUTO: 2.76 K/UL — SIGNIFICANT CHANGE UP (ref 1.2–3.4)
MAGNESIUM SERPL-MCNC: 2.2 MG/DL — SIGNIFICANT CHANGE UP (ref 1.8–2.4)
MCHC RBC-ENTMCNC: 25.7 PG — LOW (ref 27–31)
MCHC RBC-ENTMCNC: 33.9 G/DL — SIGNIFICANT CHANGE UP (ref 32–37)
MCV RBC AUTO: 75.7 FL — LOW (ref 81–99)
MONOCYTES # BLD AUTO: 1.32 K/UL — HIGH (ref 0.1–0.6)
MONOCYTES NFR BLD AUTO: 8.8 % — SIGNIFICANT CHANGE UP (ref 1.7–9.3)
NEUTROPHILS # BLD AUTO: 9.35 K/UL — HIGH (ref 1.4–6.5)
NEUTROPHILS NFR BLD AUTO: 62.3 % — SIGNIFICANT CHANGE UP (ref 42.2–75.2)
PHOSPHATE SERPL-MCNC: 1.9 MG/DL — LOW (ref 2.1–4.9)
PLATELET # BLD AUTO: 226 K/UL — SIGNIFICANT CHANGE UP (ref 130–400)
PMV BLD: 10.4 FL — SIGNIFICANT CHANGE UP (ref 7.4–10.4)
POTASSIUM SERPL-MCNC: 3.7 MMOL/L — SIGNIFICANT CHANGE UP (ref 3.5–5)
POTASSIUM SERPL-SCNC: 3.7 MMOL/L — SIGNIFICANT CHANGE UP (ref 3.5–5)
PROT SERPL-MCNC: 6 G/DL — SIGNIFICANT CHANGE UP (ref 6–8)
RBC # BLD: 4.48 M/UL — SIGNIFICANT CHANGE UP (ref 4.2–5.4)
RBC # FLD: 14.4 % — SIGNIFICANT CHANGE UP (ref 11.5–14.5)
SODIUM SERPL-SCNC: 134 MMOL/L — LOW (ref 135–146)
SPECIMEN SOURCE: SIGNIFICANT CHANGE UP
SPECIMEN SOURCE: SIGNIFICANT CHANGE UP
WBC # BLD: 15 K/UL — HIGH (ref 4.8–10.8)
WBC # FLD AUTO: 15 K/UL — HIGH (ref 4.8–10.8)

## 2023-04-13 PROCEDURE — 99231 SBSQ HOSP IP/OBS SF/LOW 25: CPT | Mod: GC

## 2023-04-13 RX ADMIN — AMPICILLIN SODIUM AND SULBACTAM SODIUM 100 GRAM(S): 250; 125 INJECTION, POWDER, FOR SUSPENSION INTRAMUSCULAR; INTRAVENOUS at 06:24

## 2023-04-13 RX ADMIN — AMPICILLIN SODIUM AND SULBACTAM SODIUM 100 GRAM(S): 250; 125 INJECTION, POWDER, FOR SUSPENSION INTRAMUSCULAR; INTRAVENOUS at 11:26

## 2023-04-13 RX ADMIN — CHLORHEXIDINE GLUCONATE 1 APPLICATION(S): 213 SOLUTION TOPICAL at 06:25

## 2023-04-13 RX ADMIN — Medication 125 MILLIGRAM(S): at 06:23

## 2023-04-13 RX ADMIN — PANTOPRAZOLE SODIUM 40 MILLIGRAM(S): 20 TABLET, DELAYED RELEASE ORAL at 06:24

## 2023-04-13 RX ADMIN — ENOXAPARIN SODIUM 40 MILLIGRAM(S): 100 INJECTION SUBCUTANEOUS at 21:44

## 2023-04-13 RX ADMIN — AMPICILLIN SODIUM AND SULBACTAM SODIUM 100 GRAM(S): 250; 125 INJECTION, POWDER, FOR SUSPENSION INTRAMUSCULAR; INTRAVENOUS at 17:06

## 2023-04-13 NOTE — PROGRESS NOTE ADULT - ASSESSMENT
32-year-old female with no significant past medical history presents as transfer from [facility] ([facility]) for evaluation of fever and diffuse itchy rash.    Plan:  - IVF  - IV abx  - pain management  - diet, reg  - solumedrol 40mg BID for 2 days then continue taper;on day 3  - Derm consult 4/12: c/w solumedrol;  no open blister for skin biopsy/ histopathological diagnosis, - Agree w/Flex Scope by ENT to evaluate airway,  Careful clinical observation for progression. - For now no need for IVIG/PEX/Cyclosporine   - no open wounds; wounds improving with steroids  - s/p ENT fiberoptic eval; no ulcerations in airway

## 2023-04-13 NOTE — PROGRESS NOTE ADULT - SUBJECTIVE AND OBJECTIVE BOX
INTERVAL HISTORY:    Events past 24 hrs***  Vital Signs Last 24 Hrs  T(C): 37.2 (2023 04:06), Max: 37.2 (2023 04:06)  T(F): 98.9 (2023 04:06), Max: 98.9 (2023 04:06)  HR: 55 (2023 04:06) (55 - 69)  BP: 102/59 (2023 04:06) (102/59 - 103/64)  BP(mean): --  RR: 19 (2023 04:06) (16 - 19)  SpO2: 95% (2023 04:06) (95% - 96%)    Parameters below as of 2023 04:06  Patient On (Oxygen Delivery Method): room air      MEDICATIONS  (STANDING):  ampicillin/sulbactam  IVPB 1.5 Gram(s) IV Intermittent every 6 hours  chlorhexidine 2% Cloths 1 Application(s) Topical <User Schedule>  enoxaparin Injectable 40 milliGRAM(s) SubCutaneous every 24 hours  methylPREDNISolone sodium succinate Injectable 125 milliGRAM(s) IV Push daily  pantoprazole    Tablet 40 milliGRAM(s) Oral before breakfast  senna 2 Tablet(s) Oral at bedtime  sodium chloride 0.9%. 1000 milliLiter(s) (75 mL/Hr) IV Continuous <Continuous>    MEDICATIONS  (PRN):  acetaminophen     Tablet .. 650 milliGRAM(s) Oral every 6 hours PRN Mild Pain (1 - 3)  diphenhydrAMINE 25 milliGRAM(s) Oral every 6 hours PRN Rash and/or Itching  ibuprofen  Tablet. 400 milliGRAM(s) Oral every 6 hours PRN Moderate Pain (4 - 6)    Allergies    Allergy Status Unknown    Intolerances        Lab Results:                        10.7   13.46 )-----------( 210      ( 2023 12:10 )             31.3     04-12    135  |  102  |  6<L>  ----------------------------<  129<H>  3.9   |  22  |  0.6<L>    Ca    7.6<L>      2023 12:10  Phos  1.3     04-12  Mg     2.4     04-12    TPro  6.0  /  Alb  3.5  /  TBili  0.3  /  DBili  x   /  AST  149<H>  /  ALT  176<H>  /  AlkPhos  301<H>      PT/INR - ( 2023 21:08 )   PT: 12.20 sec;   INR: 1.07 ratio         PTT - ( 2023 21:08 )  PTT:36.3 sec  Urinalysis Basic - ( 2023 05:20 )    Color: Light Yellow / Appearance: Clear / S.009 / pH: x  Gluc: x / Ketone: Negative  / Bili: Negative / Urobili: <2 mg/dL   Blood: x / Protein: Trace / Nitrite: Negative   Leuk Esterase: Negative / RBC: x / WBC x   Sq Epi: x / Non Sq Epi: x / Bacteria: x      LIVER FUNCTIONS - ( 2023 12:10 )  Alb: 3.5 g/dL / Pro: 6.0 g/dL / ALK PHOS: 301 U/L / ALT: 176 U/L / AST: 149 U/L / GGT: x           CAPILLARY BLOOD GLUCOSE                  IMAGING STUDIES:       EXAM:  Physical Exam:  Gen: NAD, sitting up in bed having breakfast  HEENT: NC/AT, EOMI, no icterus, no conjunctival injection, PERRLA, small oral ulceration to inner lower lip  Neck: trachea midline  Chest: full equal chest rise, no cardiopulmonary compromise  Abd: soft non-tender, non-distended  Skin: diffuse non blanching erythematous macular rash to bilateral upper and lower extremities circumferentially (sparring palms, soles, and interdigits) and entire torso and face  no open wounds appreciated. no blistering. (improved from yesterday )

## 2023-04-14 ENCOUNTER — TRANSCRIPTION ENCOUNTER (OUTPATIENT)
Age: 33
End: 2023-04-14

## 2023-04-14 VITALS
DIASTOLIC BLOOD PRESSURE: 63 MMHG | OXYGEN SATURATION: 95 % | SYSTOLIC BLOOD PRESSURE: 96 MMHG | RESPIRATION RATE: 16 BRPM | HEART RATE: 58 BPM | TEMPERATURE: 96 F

## 2023-04-14 PROCEDURE — 99238 HOSP IP/OBS DSCHRG MGMT 30/<: CPT

## 2023-04-14 RX ORDER — PANTOPRAZOLE SODIUM 20 MG/1
1 TABLET, DELAYED RELEASE ORAL
Qty: 7 | Refills: 0
Start: 2023-04-14 | End: 2023-04-20

## 2023-04-14 RX ADMIN — AMPICILLIN SODIUM AND SULBACTAM SODIUM 100 GRAM(S): 250; 125 INJECTION, POWDER, FOR SUSPENSION INTRAMUSCULAR; INTRAVENOUS at 11:43

## 2023-04-14 RX ADMIN — PANTOPRAZOLE SODIUM 40 MILLIGRAM(S): 20 TABLET, DELAYED RELEASE ORAL at 05:05

## 2023-04-14 RX ADMIN — CHLORHEXIDINE GLUCONATE 1 APPLICATION(S): 213 SOLUTION TOPICAL at 05:10

## 2023-04-14 RX ADMIN — Medication 40 MILLIGRAM(S): at 05:05

## 2023-04-14 RX ADMIN — AMPICILLIN SODIUM AND SULBACTAM SODIUM 100 GRAM(S): 250; 125 INJECTION, POWDER, FOR SUSPENSION INTRAMUSCULAR; INTRAVENOUS at 00:10

## 2023-04-14 RX ADMIN — AMPICILLIN SODIUM AND SULBACTAM SODIUM 100 GRAM(S): 250; 125 INJECTION, POWDER, FOR SUSPENSION INTRAMUSCULAR; INTRAVENOUS at 05:04

## 2023-04-14 NOTE — DISCHARGE NOTE NURSING/CASE MANAGEMENT/SOCIAL WORK - NSDCFUADDAPPT_GEN_ALL_CORE_FT
Please call [phone] to make a follow up appointment within 1 week with Dr. Angel or Dr. Oreilly. Clinic is located at [address] [place] [address] in the Advanced Cardiac Building on Tuesdays 10am -11:30am.

## 2023-04-14 NOTE — DISCHARGE NOTE PROVIDER - CARE PROVIDER_API CALL
Rea Oreilly)  Plastic Surgery  [address] [place] [address]  Phone: [phone]  Fax: [phone]  Follow Up Time: 1 week    Elvis Angel)  Plastic Surgery  [address] [place] [address]  Phone: [phone]  Fax: [phone]  Follow Up Time: 1 week

## 2023-04-14 NOTE — DISCHARGE NOTE PROVIDER - PROVIDER TOKENS
PROVIDER:[TOKEN:[[id number]],FOLLOWUP:[1 week]],PROVIDER:[TOKEN:[[id number]],FOLLOWUP:[1 week]]

## 2023-04-14 NOTE — DISCHARGE NOTE NURSING/CASE MANAGEMENT/SOCIAL WORK - NSDCPEFALRISK_GEN_ALL_CORE
For information on Fall & Injury Prevention, visit: [email or web address] OR  [email or web address] OR  https://www.cdc.gov/steadi/patient.html

## 2023-04-14 NOTE — DISCHARGE NOTE NURSING/CASE MANAGEMENT/SOCIAL WORK - PATIENT PORTAL LINK FT
You can access the FollowMyHealth Patient Portal offered by [facility] by registering at the following website: [email or web address]. By joining [facility]’s FollowMyHealth portal, you will also be able to view your health information using other applications (apps) compatible with our system.

## 2023-04-14 NOTE — PROGRESS NOTE ADULT - SUBJECTIVE AND OBJECTIVE BOX
Patient is a 32y old  Female who presents with a chief complaint of SJS/TEN (12 Apr 2023 10:49)    INTERVAL HPI/OVERNIGHT EVENTS:  - No acute events overnight  - Afebrile, no complaints     Vital Signs Last 24 Hrs  T(C): 35.8 (14 Apr 2023 04:42), Max: 36 (13 Apr 2023 14:10)  T(F): 96.5 (14 Apr 2023 04:42), Max: 96.8 (13 Apr 2023 14:10)  HR: 58 (14 Apr 2023 04:42) (58 - 66)  BP: 96/63 (14 Apr 2023 04:42) (96/63 - 117/72)  RR: 16 (14 Apr 2023 04:42) (16 - 18)  SpO2: 95% (14 Apr 2023 04:42) (93% - 97%)    O2 Parameters below as of 14 Apr 2023 04:42  Patient On (Oxygen Delivery Method): room air    LABS:                  11.5   15.00 )-----------( 226      ( 13 Apr 2023 11:46 )             33.9     04-13    134<L>  |  100  |  9<L>  ----------------------------<  186<H>  3.7   |  22  |  0.5<L>    Ca    7.9<L>      13 Apr 2023 11:46  Phos  1.9     04-13  Mg     2.2     04-13    TPro  6.0  /  Alb  3.5  /  TBili  0.5  /  DBili  x   /  AST  90<H>  /  ALT  166<H>  /  AlkPhos  283<H>  04-13    MEDICATIONS  (STANDING):  ampicillin/sulbactam  IVPB 1.5 Gram(s) IV Intermittent every 6 hours  chlorhexidine 2% Cloths 1 Application(s) Topical <User Schedule>  enoxaparin Injectable 40 milliGRAM(s) SubCutaneous every 24 hours  methylPREDNISolone sodium succinate Injectable 40 milliGRAM(s) IV Push every 12 hours  pantoprazole    Tablet 40 milliGRAM(s) Oral before breakfast  senna 2 Tablet(s) Oral at bedtime  sodium chloride 0.9%. 1000 milliLiter(s) (75 mL/Hr) IV Continuous <Continuous>    MEDICATIONS  (PRN):  acetaminophen     Tablet .. 650 milliGRAM(s) Oral every 6 hours PRN Mild Pain (1 - 3)  diphenhydrAMINE 25 milliGRAM(s) Oral every 6 hours PRN Rash and/or Itching  ibuprofen  Tablet. 400 milliGRAM(s) Oral every 6 hours PRN Moderate Pain (4 - 6)    PHYSICAL EXAM:  GENERAL: Patient lying in bed in NAD  HEAD:  Atraumatic, Normocephalic  EYES: EOMI, PERRLA  ENT: Lesion to the roof of mouth. Patient   NERVOUS SYSTEM:  Alert & Oriented X3, Good concentration  CHEST/LUNG: Breathing comfortably on RA, b/l chest rise appreciated  HEART: In no cardiopulmonary distress  SKIN: Maculopapular rash to BUE, BLE, chest, abdomen and back - improving. Patient endorses they are eating with out discomfort. No open wounds appreciated.  Patient is a 32y old  Female who presents with a chief complaint of SJS/TEN (12 Apr 2023 10:49)    INTERVAL HPI/OVERNIGHT EVENTS:  - No acute events overnight  - Afebrile, no complaints     Vital Signs Last 24 Hrs  T(C): 35.8 (14 Apr 2023 04:42), Max: 36 (13 Apr 2023 14:10)  T(F): 96.5 (14 Apr 2023 04:42), Max: 96.8 (13 Apr 2023 14:10)  HR: 58 (14 Apr 2023 04:42) (58 - 66)  BP: 96/63 (14 Apr 2023 04:42) (96/63 - 117/72)  RR: 16 (14 Apr 2023 04:42) (16 - 18)  SpO2: 95% (14 Apr 2023 04:42) (93% - 97%)    O2 Parameters below as of 14 Apr 2023 04:42  Patient On (Oxygen Delivery Method): room air    LABS:                  11.5   15.00 )-----------( 226      ( 13 Apr 2023 11:46 )             33.9     04-13    134<L>  |  100  |  9<L>  ----------------------------<  186<H>  3.7   |  22  |  0.5<L>    Ca    7.9<L>      13 Apr 2023 11:46  Phos  1.9     04-13  Mg     2.2     04-13    TPro  6.0  /  Alb  3.5  /  TBili  0.5  /  DBili  x   /  AST  90<H>  /  ALT  166<H>  /  AlkPhos  283<H>  04-13    MEDICATIONS  (STANDING):  ampicillin/sulbactam  IVPB 1.5 Gram(s) IV Intermittent every 6 hours  chlorhexidine 2% Cloths 1 Application(s) Topical <User Schedule>  enoxaparin Injectable 40 milliGRAM(s) SubCutaneous every 24 hours  methylPREDNISolone sodium succinate Injectable 40 milliGRAM(s) IV Push every 12 hours  pantoprazole    Tablet 40 milliGRAM(s) Oral before breakfast  senna 2 Tablet(s) Oral at bedtime  sodium chloride 0.9%. 1000 milliLiter(s) (75 mL/Hr) IV Continuous <Continuous>    MEDICATIONS  (PRN):  acetaminophen     Tablet .. 650 milliGRAM(s) Oral every 6 hours PRN Mild Pain (1 - 3)  diphenhydrAMINE 25 milliGRAM(s) Oral every 6 hours PRN Rash and/or Itching  ibuprofen  Tablet. 400 milliGRAM(s) Oral every 6 hours PRN Moderate Pain (4 - 6)    PHYSICAL EXAM:  GENERAL: Patient lying in bed in NAD  HEAD:  Atraumatic, Normocephalic significantly improved facial erythema   EYES: EOMI, PERRLA  ENT: Lesion to the roof of mouth. Patient   NERVOUS SYSTEM:  Alert & Oriented X3, Good concentration  CHEST/LUNG: Breathing comfortably on RA, b/l chest rise appreciated  HEART: In no cardiopulmonary distress  SKIN: Maculopapular rash to BUE, BLE, chest, abdomen and back - improving. Patient endorses she is eating with out discomfort. No open wounds appreciated.

## 2023-04-14 NOTE — DISCHARGE NOTE PROVIDER - NSDCFUADDAPPT_GEN_ALL_CORE_FT
Please call [phone] to make a follow up appointment within 1 week with Dr. Angel or Dr. Oreilly. Clinic is located at [address] [place] [address] in the Advanced Cardiac Building on Tuesdays 10am -11:30am.

## 2023-04-14 NOTE — DISCHARGE NOTE PROVIDER - NSDCCPCAREPLAN_GEN_ALL_CORE_FT
PRINCIPAL DISCHARGE DIAGNOSIS  Diagnosis: Drug reaction  Assessment and Plan of Treatment: You were admited for drug eruption which caused you to break out with a maculopapular rash. During you hosiptal course you were treated with IV fluids, IV antibiotics and steriods. Steriods were sent to your pharmacy for 5 days, please complete full course. Please call [phone] to make a follow up appointment within 1 week with Dr. Angel or Dr. Oreilly. Clinic is located at [address] [place] [address] in the Advanced Cardiac Building on Tuesdays 10am -11:30am.

## 2023-04-14 NOTE — DISCHARGE NOTE PROVIDER - CARE PROVIDERS DIRECT ADDRESSES
[email or web address]

## 2023-04-14 NOTE — DISCHARGE NOTE PROVIDER - NSFOLLOWUPCLINICS_GEN_ALL_ED_FT
[facility]-Cardiac Building Lower Level  Burn  [address] [place] [address]  Phone: [phone]  Fax:   Follow Up Time: 1 week

## 2023-04-14 NOTE — DISCHARGE NOTE PROVIDER - HOSPITAL COURSE
32-year-old female with no significant past medical history presents as transfer from [facility] ([facility]) to the ED for evaluation of fever and diffuse itchy rash.  Patient states about 3 weeks ago she had a cyst drainage on her back by dermatology and was subsequently started on Bactrim.  Since then she has been intermittently taking Bactrim and 5 days ago she developed fever, decreased appetite, NBNB vomiting after eating, few episodes of non-bloody diarrhea, sore throat and diffuse rash starting in the upper and lower extremities first then the face and torso.  She went to see her clinic and was prescribed ibuprofen and sent home. Patient went again to the clinic few days later and was diagnosed with strep, and was started on Augmentin and Zyrtec.  Patient has also been taking ibuprofen and Tylenol for her fever during this time.  She went to [facility], was given ibuprofen, prednisone, Rocephin, Vancomycin, clindamycin, Claritin and famotidine. and was transferred here for burn evaluation.  She denies other complaints. Pt denies headache, dizziness, weakness, chest pain, SOB, back pain, LOC, trauma, urinary symptoms, cough, calf pain/swelling, recent travel, recent surgery, no recent sexual activity.    While inpatient, patient was treated with IVF, IV antibiotics, steroids, GI/dvt ppx, pain management. Patient was seen by derm and recommended a steriod taper. Patient stable and medically cleared for discharge. Follow up in clinic in one week. No antibiotics needed at discharge, 5 days steroids sent to pharmacy.     Drug rxn s/p bactrim and augmentin  - IVF  - IV abx  - Pain management  - Diet, reg  - Solumedrol 40mg BID for 2 days then continue taper;on day 3  - Derm consult 4/12: c/w solumedrol;  no open blister for skin biopsy/ histopathological diagnosis, - Agree w/Flex Scope by ENT to evaluate airway,  Careful clinical observation for progression.   - No need for IVIG/PEX/Cyclosporine   - No open wounds; wounds improving with steroids  - S/p ENT fiberoptic eval; no ulcerations in airway No

## 2023-04-14 NOTE — PROGRESS NOTE ADULT - ASSESSMENT
32-year-old female with no significant past medical history presents as transfer from [facility] ([facility]) for evaluation of fever and diffuse itchy rash.    Drug rxn s/p bactrim and augmentin  - IVF  - IV abx  - Pain management  - Diet, reg  - Solumedrol 40mg BID for 2 days then continue taper;on day 3  - Derm consult 4/12: c/w solumedrol;  no open blister for skin biopsy/ histopathological diagnosis, - Agree w/Flex Scope by ENT to evaluate airway,  Careful clinical observation for progression.   - No need for IVIG/PEX/Cyclosporine   - No open wounds; wounds improving with steroids  - S/p ENT fiberoptic eval; no ulcerations in airway 32-year-old female with no significant past medical history presents as transfer from Health system ([facility]) for evaluation of fever and diffuse itchy rash.    Drug rxn s/p bactrim and augmentin  - IVF  - IV abx  - Pain management  - Diet, reg  - Solumedrol 40mg BID for 2 days then continue taper ; on day 3  - Derm consult 4/12: c/w solumedrol;  no open blister for skin biopsy/ histopathological diagnosis, - Agree w/Flex Scope by ENT to evaluate airway,  Careful clinical observation for progression.   - No need for IVIG/PEX/Cyclosporine   - No open wounds; wounds improving with steroids  - S/p ENT fiberoptic eval; no ulcerations in airway
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
## 2023-04-17 PROBLEM — E03.9 HYPOTHYROIDISM, UNSPECIFIED: Chronic | Status: ACTIVE | Noted: 2023-04-11

## 2023-04-17 PROBLEM — Z00.00 ENCOUNTER FOR PREVENTIVE HEALTH EXAMINATION: Status: ACTIVE | Noted: 2023-04-17

## 2023-04-17 LAB
CULTURE RESULTS: SIGNIFICANT CHANGE UP
CULTURE RESULTS: SIGNIFICANT CHANGE UP
SPECIMEN SOURCE: SIGNIFICANT CHANGE UP
SPECIMEN SOURCE: SIGNIFICANT CHANGE UP

## 2023-04-18 ENCOUNTER — APPOINTMENT (OUTPATIENT)
Dept: BURN CARE | Facility: CLINIC | Age: 33
End: 2023-04-18
Payer: MEDICAID

## 2023-04-18 VITALS — RESPIRATION RATE: 16 BRPM | DIASTOLIC BLOOD PRESSURE: 78 MMHG | SYSTOLIC BLOOD PRESSURE: 98 MMHG | HEART RATE: 101 BPM

## 2023-04-18 DIAGNOSIS — T36.0X5A ADVERSE EFFECT OF PENICILLINS, INITIAL ENCOUNTER: ICD-10-CM

## 2023-04-18 DIAGNOSIS — T36.8X5A ADVERSE EFFECT OF OTHER SYSTEMIC ANTIBIOTICS, INITIAL ENCOUNTER: ICD-10-CM

## 2023-04-18 DIAGNOSIS — L27.1 LOCALIZED SKIN ERUPTION DUE TO DRUGS AND MEDICAMENTS TAKEN INTERNALLY: ICD-10-CM

## 2023-04-18 DIAGNOSIS — Z20.822 CONTACT WITH AND (SUSPECTED) EXPOSURE TO COVID-19: ICD-10-CM

## 2023-04-18 DIAGNOSIS — E03.9 HYPOTHYROIDISM, UNSPECIFIED: ICD-10-CM

## 2023-04-18 DIAGNOSIS — T14.8XXA OTHER INJURY OF UNSPECIFIED BODY REGION, INITIAL ENCOUNTER: ICD-10-CM

## 2023-04-18 DIAGNOSIS — E83.51 HYPOCALCEMIA: ICD-10-CM

## 2023-04-18 DIAGNOSIS — K12.1 OTHER FORMS OF STOMATITIS: ICD-10-CM

## 2023-04-18 DIAGNOSIS — E87.4 MIXED DISORDER OF ACID-BASE BALANCE: ICD-10-CM

## 2023-04-18 DIAGNOSIS — R74.01 ELEVATION OF LEVELS OF LIVER TRANSAMINASE LEVELS: ICD-10-CM

## 2023-04-18 PROCEDURE — 99213 OFFICE O/P EST LOW 20 MIN: CPT

## 2023-04-18 RX ORDER — CAMPHOR 0.45 %
25 GEL (GRAM) TOPICAL
Qty: 30 | Refills: 3 | Status: ACTIVE | COMMUNITY
Start: 2023-04-18 | End: 1900-01-01

## 2023-04-19 NOTE — PHYSICAL EXAM
[Closed] : closed [Size%: ______] : Size: [unfilled]% [Infected?] : Infected: No [0] : 0 out of 10 [Abnormal] : abnormal [None] : none [] : no [de-identified] : The 60% TBSA rash and allergic reaction to bactrim is healed.  There are no open wounds.  The skin in red and edematous.  There is mild discomfort with swallowing.  Will cont steroid taper. The patient was instructed to clean  the wound with soap and water. Continue local wound care with moisturizer and sunscreen and hydrocortisone cream.  Follow up 1 week.

## 2023-04-19 NOTE — ASSESSMENT
[FreeTextEntry1] : The 60% TBSA rash and allergic reaction to bactrim is healed.  There are no open wounds.  The skin in red and edematous.  There is mild discomfort with swallowing.  Will cont steroid taper. The patient was instructed to clean  the wound with soap and water. Continue local wound care with moisturizer and sunscreen and hydrocortisone cream.  Follow up 1 week.  [Wound Care] : wound care
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
## 2023-04-25 ENCOUNTER — APPOINTMENT (OUTPATIENT)
Dept: BURN CARE | Facility: CLINIC | Age: 33
End: 2023-04-25
Payer: MEDICAID

## 2023-04-25 VITALS
SYSTOLIC BLOOD PRESSURE: 120 MMHG | HEART RATE: 80 BPM | DIASTOLIC BLOOD PRESSURE: 80 MMHG | TEMPERATURE: 98.1 F | BODY MASS INDEX: 26.12 KG/M2 | RESPIRATION RATE: 16 BRPM | HEIGHT: 64 IN | WEIGHT: 153 LBS

## 2023-04-25 PROCEDURE — 99212 OFFICE O/P EST SF 10 MIN: CPT
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
## 2023-04-26 NOTE — ASSESSMENT
[FreeTextEntry1] : The 60% TBSA rash and allergic reaction to bactrim is healed.  There are no open wounds.  The skin has decreased erythema  and edematous.  There is mild discomfort with swallowing.  Will cont steroid taper. The patient was instructed to clean  the wound with soap and water. Continue local wound care with moisturizer and sunscreen and hydrocortisone cream.  Follow up 1 week.  Follow up dermatology [Wound Care] : wound care

## 2023-04-26 NOTE — PHYSICAL EXAM
[Closed] : closed [Size%: ______] : Size: [unfilled]% [Infected?] : Infected: No [0] : 0 out of 10 [Abnormal] : abnormal [None] : none [] : no [de-identified] : The 60% TBSA rash and allergic reaction to bactrim is healed.  There are no open wounds.  The skin has decreased erythema  and edematous.  There is mild discomfort with swallowing.  Will cont steroid taper. The patient was instructed to clean  the wound with soap and water. Continue local wound care with moisturizer and sunscreen and hydrocortisone cream.  Follow up 1 week.  Follow up dermatology
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
## 2023-05-02 ENCOUNTER — APPOINTMENT (OUTPATIENT)
Dept: BURN CARE | Facility: CLINIC | Age: 33
End: 2023-05-02
Payer: MEDICAID

## 2023-05-02 VITALS
SYSTOLIC BLOOD PRESSURE: 116 MMHG | HEART RATE: 120 BPM | HEIGHT: 64 IN | WEIGHT: 150 LBS | BODY MASS INDEX: 25.61 KG/M2 | DIASTOLIC BLOOD PRESSURE: 85 MMHG

## 2023-05-02 PROCEDURE — 99213 OFFICE O/P EST LOW 20 MIN: CPT

## 2023-05-02 RX ORDER — HYDROCORTISONE 25 MG/G
2.5 OINTMENT TOPICAL TWICE DAILY
Qty: 1 | Refills: 2 | Status: ACTIVE | COMMUNITY
Start: 2023-04-18 | End: 1900-01-01

## 2023-05-02 NOTE — ASSESSMENT
[FreeTextEntry1] : The 60% TBSA rash and allergic reaction to bactrim is healed.  There are no open wounds.  The skin has decreased erythema  and edematous.  There is mild discomfort with swallowing.   The patient was instructed to clean  the wound with soap and water. Continue local wound care with moisturizer and sunscreen and hydrocortisone cream.  Follow up prn  Follow up dermatology/ PMD  [Wound Care] : wound care
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
## 2023-05-02 NOTE — PHYSICAL EXAM
[Closed] : closed [Size%: ______] : Size: [unfilled]% [Infected?] : Infected: No [0] : 0 out of 10 [Abnormal] : abnormal [None] : none [] : no [de-identified] : The 60% TBSA rash and allergic reaction to bactrim is healed.  There are no open wounds.  The skin has decreased erythema  and edematous.  There is mild discomfort with swallowing.   The patient was instructed to clean  the wound with soap and water. Continue local wound care with moisturizer and sunscreen and hydrocortisone cream.  Follow up prn  Follow up dermatology/ PMD
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
